# Patient Record
Sex: MALE | Race: WHITE | Employment: FULL TIME | URBAN - METROPOLITAN AREA
[De-identification: names, ages, dates, MRNs, and addresses within clinical notes are randomized per-mention and may not be internally consistent; named-entity substitution may affect disease eponyms.]

---

## 2018-10-02 ENCOUNTER — OFFICE VISIT (OUTPATIENT)
Dept: FAMILY MEDICINE CLINIC | Facility: CLINIC | Age: 68
End: 2018-10-02
Payer: MEDICARE

## 2018-10-02 VITALS — BODY MASS INDEX: 31.4 KG/M2 | WEIGHT: 212 LBS | HEIGHT: 69 IN

## 2018-10-02 DIAGNOSIS — H61.23 BILATERAL IMPACTED CERUMEN: Primary | ICD-10-CM

## 2018-10-02 PROCEDURE — 69209 REMOVE IMPACTED EAR WAX UNI: CPT | Performed by: NURSE PRACTITIONER

## 2018-10-02 NOTE — PATIENT INSTRUCTIONS
Impacted Earwax     Inner ear structures including ear canal and eardrum. Impacted earwax is a buildup of the natural wax in the ear (cerumen). Impacted earwax is very common. It can cause symptoms such as hearing loss.  It can also make it difficult Excess earwax usually does not cause any symptoms, unless there is a large amount of buildup.  Then it may cause symptoms such as:  · Hearing loss  · Earache  · Sense of ear fullness  · Itching in the ear  · Odor from the ear  · Ear drainage  · Dizziness  · © 9397-9719 [employer] [address]. [address] [facility] [place], [facility] [place]. All rights reserved. This information is not intended as a substitute for professional medical care. Always follow your healthcare professional's instructions.

## 2018-10-02 NOTE — PROGRESS NOTES
Patient presents with:  Ear Wax: needs removal      HPI:   Lalito Vasquez is a 70year old male who presents requesting ear wax removal.  Has ongoing issue with cerumen build up. Last had cerumen removed approx 2 years ago.    Home treatments tried: no Alcohol use: No      Alcohol/week: 0.0 oz    Drug use: No        Cerumen Removal Procedure  Patient gave verbal consent. Risks and Benefits of removal were discussed with the patient. Pr agreed to proceed with procedure.     Location: [facility]  [employer]ica Earwax can build up because of many health conditions. Some cause a physical blockage. Others cause too much earwax to be made.  Health conditions that can cause earwax buildup include:  · Use of cotton swabs to clean deep in the ear canal  · Bony blockage · Ringing in the ears  · Bleeding from the ear  Talk with your healthcare provider about which risks apply most to you. Don’t use these at home  Healthcare providers do not advise use of ear candles or ear vacuum kits.  These methods are not shown to work

## 2018-10-26 ENCOUNTER — APPOINTMENT (OUTPATIENT)
Dept: GENERAL RADIOLOGY | Facility: HOSPITAL | Age: 68
End: 2018-10-26
Attending: EMERGENCY MEDICINE
Payer: MEDICARE

## 2018-10-26 ENCOUNTER — HOSPITAL ENCOUNTER (EMERGENCY)
Facility: HOSPITAL | Age: 68
Discharge: HOME | End: 2018-10-26
Attending: EMERGENCY MEDICINE
Payer: MEDICARE

## 2018-10-26 VITALS
HEIGHT: 72 IN | WEIGHT: 200 LBS | SYSTOLIC BLOOD PRESSURE: 132 MMHG | TEMPERATURE: 98 F | DIASTOLIC BLOOD PRESSURE: 91 MMHG | RESPIRATION RATE: 25 BRPM | BODY MASS INDEX: 27.09 KG/M2 | OXYGEN SATURATION: 96 % | HEART RATE: 71 BPM

## 2018-10-26 DIAGNOSIS — M25.551 RIGHT HIP PAIN: Primary | ICD-10-CM

## 2018-10-26 PROCEDURE — 73502 X-RAY EXAM HIP UNI 2-3 VIEWS: CPT | Performed by: EMERGENCY MEDICINE

## 2018-10-26 PROCEDURE — 99283 EMERGENCY DEPT VISIT LOW MDM: CPT

## 2018-10-26 RX ORDER — IBUPROFEN 600 MG/1
600 TABLET ORAL EVERY 8 HOURS PRN
Qty: 30 TABLET | Refills: 0 | Status: SHIPPED | OUTPATIENT
Start: 2018-10-26 | End: 2018-11-02

## 2018-10-26 RX ORDER — IBUPROFEN 600 MG/1
600 TABLET ORAL ONCE
Status: COMPLETED | OUTPATIENT
Start: 2018-10-26 | End: 2018-10-26

## 2018-10-26 NOTE — ED PROVIDER NOTES
Patient Seen in: [facility] Emergency Department    History   Patient presents with:  Pain (neurologic)    Stated Complaint: R HIP PAIN, NO INJURY    HPI    39-year-old male presents complaining of right hip pain without known injury that began yest (Room air)       Current:BP (!) 132/91   Pulse 71   Temp 98.2 °F (36.8 °C) (Temporal)   Resp 25   Ht 182.9 cm (6')   Wt 90.7 kg   SpO2 96%   BMI 27.12 kg/m²         Physical Exam    General appearance: This is a middle-aged male lying in a gurney.   Vital s diagnosis)    Disposition:  Discharge  10/26/2018  8:29 am    Follow-up:  Zena Allen, [address]  [facility] [address] [phone]    Call today          Medications Prescribed:  Discharge Medication List as of 10/26/2018  8:40 AM    START taking

## 2018-10-31 ENCOUNTER — HOSPITAL ENCOUNTER (EMERGENCY)
Facility: HOSPITAL | Age: 68
Discharge: HOME | End: 2018-10-31
Attending: EMERGENCY MEDICINE
Payer: MEDICARE

## 2018-10-31 VITALS
SYSTOLIC BLOOD PRESSURE: 118 MMHG | BODY MASS INDEX: 28.44 KG/M2 | WEIGHT: 210 LBS | DIASTOLIC BLOOD PRESSURE: 78 MMHG | TEMPERATURE: 97 F | RESPIRATION RATE: 16 BRPM | HEART RATE: 72 BPM | OXYGEN SATURATION: 97 % | HEIGHT: 72 IN

## 2018-10-31 DIAGNOSIS — M70.71 BURSITIS OF RIGHT HIP, UNSPECIFIED BURSA: Primary | ICD-10-CM

## 2018-10-31 PROCEDURE — 99283 EMERGENCY DEPT VISIT LOW MDM: CPT | Performed by: EMERGENCY MEDICINE

## 2018-10-31 RX ORDER — HYDROCODONE BITARTRATE AND ACETAMINOPHEN 5; 325 MG/1; MG/1
1-2 TABLET ORAL EVERY 6 HOURS PRN
Qty: 10 TABLET | Refills: 0 | Status: SHIPPED | OUTPATIENT
Start: 2018-10-31 | End: 2018-11-07

## 2018-10-31 RX ORDER — METHYLPREDNISOLONE 4 MG/1
TABLET ORAL
Qty: 1 PACKAGE | Refills: 0 | Status: SHIPPED | OUTPATIENT
Start: 2018-10-31 | End: 2018-11-05

## 2018-10-31 NOTE — ED PROVIDER NOTES
Patient Seen in: [facility] Emergency Department    History   Patient presents with:  Lower Extremity Injury (musculoskeletal)    Stated Complaint: Right hip pain, was here last thursday they told him he pulled a muscle.     HPI    70year-old compla Exam     ED Triage Vitals [10/31/18 8602]   /83   Pulse 74   Resp 16   Temp 97.2 °F (36.2 °C)   Temp src Tympanic   SpO2 96 %   O2 Device None (Room air)       Current:/83   Pulse 74   Temp 97.2 °F (36.2 °C) (Tympanic)   Resp 16   Ht 182.9 cm (

## 2018-10-31 NOTE — ED INITIAL ASSESSMENT (HPI)
Pt states he was diagnosed with pulled muscles last Thursday (last week) when he was seen here. Denies injury. Pt states he's still experiencing severe pain to his right hip with difficulty walking.

## 2019-12-17 ENCOUNTER — OFFICE VISIT (OUTPATIENT)
Dept: FAMILY MEDICINE CLINIC | Facility: CLINIC | Age: 69
End: 2019-12-17
Payer: MEDICARE

## 2019-12-17 VITALS
WEIGHT: 206 LBS | RESPIRATION RATE: 20 BRPM | SYSTOLIC BLOOD PRESSURE: 118 MMHG | BODY MASS INDEX: 27.9 KG/M2 | HEIGHT: 72 IN | DIASTOLIC BLOOD PRESSURE: 70 MMHG | HEART RATE: 72 BPM | OXYGEN SATURATION: 98 % | TEMPERATURE: 97 F

## 2019-12-17 DIAGNOSIS — H61.23 BILATERAL IMPACTED CERUMEN: Primary | ICD-10-CM

## 2019-12-17 PROCEDURE — 99213 OFFICE O/P EST LOW 20 MIN: CPT | Performed by: NURSE PRACTITIONER

## 2019-12-17 NOTE — PATIENT INSTRUCTIONS
Impacted Earwax     Inner ear structures including ear canal and eardrum. Impacted earwax is a buildup of the natural wax in the ear (cerumen). Impacted earwax is very common. It can cause symptoms such as hearing loss.  It can also make it difficult Excess earwax usually does not cause any symptoms, unless there is a large amount of buildup.  Then it may cause symptoms such as:  · Hearing loss  · Earache  · Sense of ear fullness  · Itching in the ear  · Odor from the ear  · Ear drainage  · Dizziness  · © 2552-4854 [employer]. [address] [facility] [place] [place]. All rights reserved. This information is not intended as a substitute for professional medical care. Always follow your healthcare professional's instructions.

## 2019-12-17 NOTE — PROGRESS NOTES
S/O:  Well nourished 77 yo male in for cerumen removal.  Relates that his has been a life time problem:  Cerumen removal procedure both risk and benefits explained with understanding and consent given.    A/P:  Unable to visualize bilateral canals with otos

## 2020-01-11 PROBLEM — R73.02 GLUCOSE INTOLERANCE (IMPAIRED GLUCOSE TOLERANCE): Status: ACTIVE | Noted: 2020-01-11

## 2020-04-08 PROCEDURE — 88305 TISSUE EXAM BY PATHOLOGIST: CPT | Performed by: INTERNAL MEDICINE

## 2021-02-03 PROBLEM — K63.5 SERRATED POLYP OF COLON: Status: ACTIVE | Noted: 2020-04-08

## 2022-04-29 ENCOUNTER — HOSPITAL ENCOUNTER (EMERGENCY)
Facility: HOSPITAL | Age: 72
Discharge: HOME | End: 2022-04-29
Attending: EMERGENCY MEDICINE
Payer: MEDICARE

## 2022-04-29 VITALS
TEMPERATURE: 98 F | HEIGHT: 72 IN | OXYGEN SATURATION: 95 % | DIASTOLIC BLOOD PRESSURE: 86 MMHG | HEART RATE: 76 BPM | SYSTOLIC BLOOD PRESSURE: 141 MMHG | RESPIRATION RATE: 18 BRPM | BODY MASS INDEX: 29.8 KG/M2 | WEIGHT: 220 LBS

## 2022-04-29 DIAGNOSIS — M43.6 TORTICOLLIS: Primary | ICD-10-CM

## 2022-04-29 PROCEDURE — 99283 EMERGENCY DEPT VISIT LOW MDM: CPT

## 2022-04-29 RX ORDER — CYCLOBENZAPRINE HCL 5 MG
5 TABLET ORAL 3 TIMES DAILY PRN
Qty: 10 TABLET | Refills: 0 | Status: SHIPPED | OUTPATIENT
Start: 2022-04-29

## 2022-04-29 RX ORDER — IBUPROFEN 200 MG
400 TABLET ORAL ONCE
Status: COMPLETED | OUTPATIENT
Start: 2022-04-29 | End: 2022-04-29

## 2022-04-29 NOTE — ED QUICK NOTES
Ambulating with a steady gait. Pt reports he drove himself to the ER today. Pt does have full range of motion in his neck but does report pain  When he moves it.

## 2022-04-29 NOTE — ED INITIAL ASSESSMENT (HPI)
Pt reports he pulled a muscle on the right side of his neck about 2 days ago. States he has been taking tylenol every 6 hours with no relief. Reports pain when trying to move his neck in any direction.

## 2023-08-09 ENCOUNTER — APPOINTMENT (OUTPATIENT)
Dept: CT IMAGING | Facility: HOSPITAL | Age: 73
End: 2023-08-09
Attending: EMERGENCY MEDICINE
Payer: MEDICARE

## 2023-08-09 ENCOUNTER — HOSPITAL ENCOUNTER (EMERGENCY)
Facility: HOSPITAL | Age: 73
Discharge: HOME | End: 2023-08-09
Attending: EMERGENCY MEDICINE
Payer: MEDICARE

## 2023-08-09 VITALS
WEIGHT: 220 LBS | DIASTOLIC BLOOD PRESSURE: 78 MMHG | OXYGEN SATURATION: 95 % | HEART RATE: 77 BPM | TEMPERATURE: 98 F | SYSTOLIC BLOOD PRESSURE: 121 MMHG | BODY MASS INDEX: 29.8 KG/M2 | RESPIRATION RATE: 20 BRPM | HEIGHT: 72 IN

## 2023-08-09 DIAGNOSIS — I26.94 MULTIPLE SUBSEGMENTAL PULMONARY EMBOLI WITHOUT ACUTE COR PULMONALE (HCC): Primary | ICD-10-CM

## 2023-08-09 PROBLEM — R73.9 HYPERGLYCEMIA: Status: ACTIVE | Noted: 2023-08-09

## 2023-08-09 PROBLEM — E87.6 HYPOKALEMIA: Status: ACTIVE | Noted: 2023-08-09

## 2023-08-09 PROBLEM — D69.6 THROMBOCYTOPENIA (HCC): Status: ACTIVE | Noted: 2023-08-09

## 2023-08-09 LAB
ALBUMIN SERPL-MCNC: 3.4 G/DL (ref 3.4–5)
ALBUMIN/GLOB SERPL: 0.9 {RATIO} (ref 1–2)
ALP LIVER SERPL-CCNC: 104 U/L
ALT SERPL-CCNC: 33 U/L
ANION GAP SERPL CALC-SCNC: 4 MMOL/L (ref 0–18)
APTT PPP: 35 SECONDS (ref 23.3–35.6)
AST SERPL-CCNC: 20 U/L (ref 15–37)
ATRIAL RATE: 81 BPM
BASOPHILS # BLD AUTO: 0.01 X10(3) UL (ref 0–0.2)
BASOPHILS NFR BLD AUTO: 0.1 %
BILIRUB SERPL-MCNC: 0.6 MG/DL (ref 0.1–2)
BILIRUB UR QL STRIP.AUTO: NEGATIVE
BUN BLD-MCNC: 18 MG/DL (ref 7–18)
CALCIUM BLD-MCNC: 8.7 MG/DL (ref 8.5–10.1)
CHLORIDE SERPL-SCNC: 109 MMOL/L (ref 98–112)
CLARITY UR REFRACT.AUTO: CLEAR
CO2 SERPL-SCNC: 25 MMOL/L (ref 21–32)
COLOR UR AUTO: YELLOW
CREAT BLD-MCNC: 1 MG/DL
EGFRCR SERPLBLD CKD-EPI 2021: 79 ML/MIN/1.73M2 (ref 60–?)
EOSINOPHIL # BLD AUTO: 0.04 X10(3) UL (ref 0–0.7)
EOSINOPHIL NFR BLD AUTO: 0.4 %
ERYTHROCYTE [DISTWIDTH] IN BLOOD BY AUTOMATED COUNT: 13.2 %
GLOBULIN PLAS-MCNC: 3.9 G/DL (ref 2.8–4.4)
GLUCOSE BLD-MCNC: 124 MG/DL (ref 70–99)
GLUCOSE UR STRIP.AUTO-MCNC: NEGATIVE MG/DL
HCT VFR BLD AUTO: 34.1 %
HGB BLD-MCNC: 11.5 G/DL
IMM GRANULOCYTES # BLD AUTO: 0.04 X10(3) UL (ref 0–1)
IMM GRANULOCYTES NFR BLD: 0.4 %
INR BLD: 1.16 (ref 0.85–1.16)
KETONES UR STRIP.AUTO-MCNC: NEGATIVE MG/DL
LEUKOCYTE ESTERASE UR QL STRIP.AUTO: NEGATIVE
LYMPHOCYTES # BLD AUTO: 0.85 X10(3) UL (ref 1–4)
LYMPHOCYTES NFR BLD AUTO: 9 %
MCH RBC QN AUTO: 30.3 PG (ref 26–34)
MCHC RBC AUTO-ENTMCNC: 33.7 G/DL (ref 31–37)
MCV RBC AUTO: 89.7 FL
MONOCYTES # BLD AUTO: 0.94 X10(3) UL (ref 0.1–1)
MONOCYTES NFR BLD AUTO: 9.9 %
NEUTROPHILS # BLD AUTO: 7.6 X10 (3) UL (ref 1.5–7.7)
NEUTROPHILS # BLD AUTO: 7.6 X10(3) UL (ref 1.5–7.7)
NEUTROPHILS NFR BLD AUTO: 80.2 %
NITRITE UR QL STRIP.AUTO: NEGATIVE
NT-PROBNP SERPL-MCNC: 46 PG/ML (ref ?–125)
OSMOLALITY SERPL CALC.SUM OF ELEC: 289 MOSM/KG (ref 275–295)
P AXIS: 9 DEGREES
P-R INTERVAL: 162 MS
PH UR STRIP.AUTO: 6 [PH] (ref 5–8)
PLATELET # BLD AUTO: 146 10(3)UL (ref 150–450)
POTASSIUM SERPL-SCNC: 3.5 MMOL/L (ref 3.5–5.1)
PROT SERPL-MCNC: 7.3 G/DL (ref 6.4–8.2)
PROT UR STRIP.AUTO-MCNC: NEGATIVE MG/DL
PROTHROMBIN TIME: 14.8 SECONDS (ref 11.6–14.8)
Q-T INTERVAL: 372 MS
QRS DURATION: 80 MS
QTC CALCULATION (BEZET): 432 MS
R AXIS: 83 DEGREES
RBC # BLD AUTO: 3.8 X10(6)UL
RBC UR QL AUTO: NEGATIVE
SODIUM SERPL-SCNC: 138 MMOL/L (ref 136–145)
SP GR UR STRIP.AUTO: >1.03 (ref 1–1.03)
T AXIS: 73 DEGREES
TROPONIN I HIGH SENSITIVITY: 6 NG/L
UROBILINOGEN UR STRIP.AUTO-MCNC: <2 MG/DL
VENTRICULAR RATE: 81 BPM
WBC # BLD AUTO: 9.5 X10(3) UL (ref 4–11)

## 2023-08-09 PROCEDURE — 99285 EMERGENCY DEPT VISIT HI MDM: CPT

## 2023-08-09 PROCEDURE — 96374 THER/PROPH/DIAG INJ IV PUSH: CPT

## 2023-08-09 PROCEDURE — 96361 HYDRATE IV INFUSION ADD-ON: CPT

## 2023-08-09 PROCEDURE — 85610 PROTHROMBIN TIME: CPT | Performed by: EMERGENCY MEDICINE

## 2023-08-09 PROCEDURE — 81001 URINALYSIS AUTO W/SCOPE: CPT | Performed by: EMERGENCY MEDICINE

## 2023-08-09 PROCEDURE — 83880 ASSAY OF NATRIURETIC PEPTIDE: CPT | Performed by: HOSPITALIST

## 2023-08-09 PROCEDURE — 85025 COMPLETE CBC W/AUTO DIFF WBC: CPT | Performed by: EMERGENCY MEDICINE

## 2023-08-09 PROCEDURE — 71275 CT ANGIOGRAPHY CHEST: CPT | Performed by: EMERGENCY MEDICINE

## 2023-08-09 PROCEDURE — 80053 COMPREHEN METABOLIC PANEL: CPT | Performed by: EMERGENCY MEDICINE

## 2023-08-09 PROCEDURE — 84484 ASSAY OF TROPONIN QUANT: CPT | Performed by: HOSPITALIST

## 2023-08-09 PROCEDURE — 74177 CT ABD & PELVIS W/CONTRAST: CPT | Performed by: EMERGENCY MEDICINE

## 2023-08-09 PROCEDURE — 85730 THROMBOPLASTIN TIME PARTIAL: CPT | Performed by: EMERGENCY MEDICINE

## 2023-08-09 PROCEDURE — 93005 ELECTROCARDIOGRAM TRACING: CPT

## 2023-08-09 PROCEDURE — 93010 ELECTROCARDIOGRAM REPORT: CPT

## 2023-08-09 RX ORDER — KETOROLAC TROMETHAMINE 15 MG/ML
15 INJECTION, SOLUTION INTRAMUSCULAR; INTRAVENOUS ONCE
Status: COMPLETED | OUTPATIENT
Start: 2023-08-09 | End: 2023-08-09

## 2023-08-09 RX ORDER — HYDROCODONE BITARTRATE AND ACETAMINOPHEN 5; 325 MG/1; MG/1
1 TABLET ORAL EVERY 8 HOURS PRN
Qty: 30 TABLET | Refills: 0 | Status: SHIPPED | OUTPATIENT
Start: 2023-08-09 | End: 2023-08-09

## 2023-08-09 RX ORDER — NAPROXEN 500 MG/1
500 TABLET ORAL 2 TIMES DAILY PRN
Qty: 60 TABLET | Refills: 1 | Status: SHIPPED | OUTPATIENT
Start: 2023-08-09

## 2023-08-09 NOTE — DISCHARGE INSTRUCTIONS
Follow up with your PCP or another [address] [place], [place] [address] as soon as possible, within 1 week. Take tylenol or naproxen as needed for pain. Always take naproxen with food in order to minimize the risk of GI upset or ulcers. If you notice worsening abdominal pain or black/bloody stools, stop taking naproxen and follow up at your physician's office or a [address] [place] [address].

## (undated) NOTE — LETTER
Date & Time: 10/26/2018, 8:29 AM  Patient: Deepika Perkins  Encounter Provider(s):    Juan Carlos Coello MD       To Whom It May Concern:    Na Bender was seen and treated in our department on 10/26/2018.  He should not return to work until [employer] [employer]

## (undated) NOTE — ED AVS SNAPSHOT
Mr. Christian Harp   MRN: [id number]    Department:  [facility] Emergency Department   Date of Visit:  10/31/2018           Disclosure     Insurance plans vary and the physician(s) referred by the ER may not be covered by your plan.  Please conta tell this physician (or your personal doctor if your instructions are to return to your personal doctor) about any new or lasting problems. The primary care or specialist physician will see patients referred from the [facility] Emergency Department.  Filipe Olea

## (undated) NOTE — ED AVS SNAPSHOT
Mr. Catarino Davalos   MRN: [id number]    Department:  [facility] Emergency Department   Date of Visit:  10/26/2018           Disclosure     Insurance plans vary and the physician(s) referred by the ER may not be covered by your plan.  Please conta tell this physician (or your personal doctor if your instructions are to return to your personal doctor) about any new or lasting problems. The primary care or specialist physician will see patients referred from the [facility] Emergency Department.  Justino Jordan